# Patient Record
Sex: MALE
[De-identification: names, ages, dates, MRNs, and addresses within clinical notes are randomized per-mention and may not be internally consistent; named-entity substitution may affect disease eponyms.]

---

## 2018-09-12 ENCOUNTER — HOSPITAL ENCOUNTER (EMERGENCY)
Dept: HOSPITAL 14 - H.ER | Age: 53
Discharge: HOME | End: 2018-09-12
Payer: SELF-PAY

## 2018-09-12 VITALS
OXYGEN SATURATION: 100 % | HEART RATE: 75 BPM | RESPIRATION RATE: 16 BRPM | DIASTOLIC BLOOD PRESSURE: 87 MMHG | TEMPERATURE: 98.7 F | SYSTOLIC BLOOD PRESSURE: 139 MMHG

## 2018-09-12 VITALS — BODY MASS INDEX: 25.8 KG/M2
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
## 2018-09-12 DIAGNOSIS — S93.401A: Primary | ICD-10-CM

## 2018-09-12 DIAGNOSIS — Z88.0: ICD-10-CM

## 2018-09-13 NOTE — RAD
Date of service: 



09/12/2018



PROCEDURE:  Right Ankle Radiographs.



HISTORY:

trauma  



COMPARISON:

None



FINDINGS:



BONES:

Bone alignment and mineralization are normal.  There is no acute 

displaced fracture or bone destruction.



JOINTS:

Normal. No osteoarthritis. Ankle mortise maintained. Talar dome intact



SOFT TISSUES:

There is mild lateral soft tissue swelling. 



OTHER FINDINGS:

None.



IMPRESSION:

No acute fracture or dislocation.